# Patient Record
Sex: FEMALE | URBAN - METROPOLITAN AREA
[De-identification: names, ages, dates, MRNs, and addresses within clinical notes are randomized per-mention and may not be internally consistent; named-entity substitution may affect disease eponyms.]

---

## 2021-03-16 ENCOUNTER — OFFICE VISIT (OUTPATIENT)
Dept: URGENT CARE | Age: 21
End: 2021-03-16
Payer: COMMERCIAL

## 2021-03-16 VITALS — TEMPERATURE: 99.4 F | RESPIRATION RATE: 17 BRPM | HEART RATE: 60 BPM | OXYGEN SATURATION: 99 %

## 2021-03-16 DIAGNOSIS — Z20.822 ENCOUNTER FOR LABORATORY TESTING FOR COVID-19 VIRUS: Primary | ICD-10-CM

## 2021-03-16 PROCEDURE — 99202 OFFICE O/P NEW SF 15 MIN: CPT | Performed by: NURSE PRACTITIONER

## 2021-03-16 RX ORDER — ESCITALOPRAM OXALATE 10 MG/1
10 TABLET ORAL DAILY
COMMUNITY

## 2021-03-16 NOTE — PROGRESS NOTES
Subjective: (As above and below)       This patient was seen in [facility] at [address] [place] [address] [facility]gent [facility] while in their vehicle due to COVID-19 pandemic with PPE and focused examination in order to decrease community viral transmission. The patient/guardian gave verbal consent to treat. Chief Complaint   Patient presents with    Covid Testing     no exp, no symptoms     Scarlett Pham is a 24 y.o. adult who presents for COVID-19 testing  Exposure to someone who possibly has covid with pending results  Currently has not tried any therapies and denies any symptoms at this point in time. Feels well otherwise. Denies any symptoms currently or recently. Review of Systems - negative except as listed above    Reviewed PmHx, RxHx, FmHx, SocHx, AllgHx and updated in chart. History reviewed. No pertinent family history. History reviewed. No pertinent past medical history. Social History     Socioeconomic History    Marital status: UNKNOWN     Spouse name: Not on file    Number of children: Not on file    Years of education: Not on file    Highest education level: Not on file   Tobacco Use    Smoking status: Never Smoker    Smokeless tobacco: Never Used          Current Outpatient Medications   Medication Sig    escitalopram oxalate (Lexapro) 10 mg tablet Take 10 mg by mouth daily. No current facility-administered medications for this visit. Objective:     Vitals:    03/16/21 1656   Pulse: 60   Resp: 17   Temp: 99.4 °F (37.4 °C)   SpO2: 99%       Physical Exam  General appearance  appears well hydrated and does not appear toxic, no acute distress  Eyes - EOMs intact. Non injected. No scleral icterus   Ears - no external swelling  Neck/Lymphatics  trachea midline, no obvious swelling  Chest - normal breathing effort. No active cough heard. No audible wheezing.   Heart - HR-see vitals  Skin - no observable rashes or pallor  Neurologic- alert and oriented x 3  Psychiatric- normal mood, behavior and though content. Assessment/ Plan:     1. Encounter for laboratory testing for COVID-19 virus    - NOVEL CORONAVIRUS (COVID-19)      Will test for COVID  Should follow CDC guidelines for self isolation for any positive results.       Analy Osullivan NP

## 2021-03-16 NOTE — LETTER
NOTIFICATION RETURN TO WORK / SCHOOL 
 
3/16/2021 5:16 PM 
 
  Kevin Tello [address] [id number] To Whom It May Concern: 
 
Kevin Tello is currently under the care of [facility]. Tested for COVID 19 today. Results pending. If there are questions or concerns please have the patient contact our office. Sincerely, G[facility] PROVIDER

## 2021-03-17 LAB — SARS-COV-2, NAA: NOT DETECTED

## 2023-05-14 RX ORDER — ESCITALOPRAM OXALATE 10 MG/1
10 TABLET ORAL DAILY
COMMUNITY